# Patient Record
Sex: FEMALE | Race: WHITE | URBAN - METROPOLITAN AREA
[De-identification: names, ages, dates, MRNs, and addresses within clinical notes are randomized per-mention and may not be internally consistent; named-entity substitution may affect disease eponyms.]

---

## 2017-01-18 ENCOUNTER — PRENATAL OFFICE VISIT - HEALTHEAST (OUTPATIENT)
Dept: MIDWIFE SERVICES | Facility: CLINIC | Age: 32
End: 2017-01-18

## 2017-01-18 DIAGNOSIS — Z23 NEED FOR INFLUENZA VACCINATION: ICD-10-CM

## 2017-01-18 ASSESSMENT — MIFFLIN-ST. JEOR: SCORE: 1571.67

## 2017-02-14 ENCOUNTER — PRENATAL OFFICE VISIT - HEALTHEAST (OUTPATIENT)
Dept: MIDWIFE SERVICES | Facility: CLINIC | Age: 32
End: 2017-02-14

## 2017-02-14 DIAGNOSIS — Z34.81 ENCOUNTER FOR SUPERVISION OF OTHER NORMAL PREGNANCY IN FIRST TRIMESTER: ICD-10-CM

## 2017-02-14 ASSESSMENT — MIFFLIN-ST. JEOR: SCORE: 1594.35

## 2017-02-16 ENCOUNTER — COMMUNICATION - HEALTHEAST (OUTPATIENT)
Dept: OBGYN | Facility: CLINIC | Age: 32
End: 2017-02-16

## 2017-03-03 ENCOUNTER — HOSPITAL ENCOUNTER (OUTPATIENT)
Dept: ULTRASOUND IMAGING | Facility: HOSPITAL | Age: 32
Discharge: HOME | End: 2017-03-03
Attending: ADVANCED PRACTICE MIDWIFE

## 2017-03-03 DIAGNOSIS — Z34.81 ENCOUNTER FOR SUPERVISION OF OTHER NORMAL PREGNANCY IN FIRST TRIMESTER: ICD-10-CM

## 2017-03-06 ENCOUNTER — AMBULATORY - HEALTHEAST (OUTPATIENT)
Dept: MIDWIFE SERVICES | Facility: CLINIC | Age: 32
End: 2017-03-06

## 2017-03-15 ENCOUNTER — PRENATAL OFFICE VISIT - HEALTHEAST (OUTPATIENT)
Dept: MIDWIFE SERVICES | Facility: CLINIC | Age: 32
End: 2017-03-15

## 2017-03-15 DIAGNOSIS — Z34.81 ENCOUNTER FOR SUPERVISION OF OTHER NORMAL PREGNANCY IN FIRST TRIMESTER: ICD-10-CM

## 2017-03-15 DIAGNOSIS — O99.212 OBESITY IN PREGNANCY, ANTEPARTUM, SECOND TRIMESTER: ICD-10-CM

## 2017-03-15 ASSESSMENT — MIFFLIN-ST. JEOR: SCORE: 1621.57

## 2017-04-04 ENCOUNTER — COMMUNICATION - HEALTHEAST (OUTPATIENT)
Dept: OBGYN | Facility: CLINIC | Age: 32
End: 2017-04-04

## 2017-04-05 ENCOUNTER — PRENATAL OFFICE VISIT - HEALTHEAST (OUTPATIENT)
Dept: MIDWIFE SERVICES | Facility: CLINIC | Age: 32
End: 2017-04-05

## 2017-04-05 DIAGNOSIS — Z34.82 ENCOUNTER FOR SUPERVISION OF OTHER NORMAL PREGNANCY IN SECOND TRIMESTER: ICD-10-CM

## 2017-04-05 ASSESSMENT — MIFFLIN-ST. JEOR: SCORE: 1617.03

## 2017-04-21 ENCOUNTER — PRENATAL OFFICE VISIT - HEALTHEAST (OUTPATIENT)
Dept: MIDWIFE SERVICES | Facility: CLINIC | Age: 32
End: 2017-04-21

## 2017-04-21 DIAGNOSIS — Z34.82 ENCOUNTER FOR SUPERVISION OF OTHER NORMAL PREGNANCY IN SECOND TRIMESTER: ICD-10-CM

## 2017-04-21 ASSESSMENT — MIFFLIN-ST. JEOR: SCORE: 1639.71

## 2017-04-22 LAB — SYPHILIS RPR SCREEN - HISTORICAL: NORMAL

## 2017-05-08 ENCOUNTER — COMMUNICATION - HEALTHEAST (OUTPATIENT)
Dept: OBGYN | Facility: CLINIC | Age: 32
End: 2017-05-08

## 2017-05-08 DIAGNOSIS — M62.830 BACK SPASM: ICD-10-CM

## 2017-05-19 ENCOUNTER — PRENATAL OFFICE VISIT - HEALTHEAST (OUTPATIENT)
Dept: MIDWIFE SERVICES | Facility: CLINIC | Age: 32
End: 2017-05-19

## 2017-05-19 DIAGNOSIS — Z34.82 ENCOUNTER FOR SUPERVISION OF OTHER NORMAL PREGNANCY IN SECOND TRIMESTER: ICD-10-CM

## 2017-05-19 ASSESSMENT — MIFFLIN-ST. JEOR: SCORE: 1643.34

## 2017-05-22 ENCOUNTER — COMMUNICATION - HEALTHEAST (OUTPATIENT)
Dept: OBGYN | Facility: CLINIC | Age: 32
End: 2017-05-22

## 2017-05-22 ENCOUNTER — HOSPITAL ENCOUNTER (OUTPATIENT)
Dept: OBGYN | Facility: HOSPITAL | Age: 32
Discharge: HOME | End: 2017-05-22
Attending: ADVANCED PRACTICE MIDWIFE | Admitting: ADVANCED PRACTICE MIDWIFE

## 2017-05-25 ENCOUNTER — PRENATAL OFFICE VISIT - HEALTHEAST (OUTPATIENT)
Dept: MIDWIFE SERVICES | Facility: CLINIC | Age: 32
End: 2017-05-25

## 2017-05-25 DIAGNOSIS — O26.03 EXCESSIVE WEIGHT GAIN IN PREGNANCY, THIRD TRIMESTER: ICD-10-CM
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
## 2017-05-25 DIAGNOSIS — O99.213 OBESITY IN PREGNANCY, ANTEPARTUM, THIRD TRIMESTER: ICD-10-CM

## 2017-05-25 DIAGNOSIS — M62.830 BACK SPASM: ICD-10-CM

## 2017-05-25 DIAGNOSIS — Z34.93 ENCOUNTER FOR SUPERVISION OF NORMAL PREGNANCY IN THIRD TRIMESTER: ICD-10-CM

## 2017-05-25 ASSESSMENT — MIFFLIN-ST. JEOR: SCORE: 1648

## 2017-06-07 ENCOUNTER — PRENATAL OFFICE VISIT - HEALTHEAST (OUTPATIENT)
Dept: MIDWIFE SERVICES | Facility: CLINIC | Age: 32
End: 2017-06-07

## 2017-06-07 DIAGNOSIS — Z34.80 ENCOUNTER FOR SUPERVISION OF OTHER NORMAL PREGNANCY: ICD-10-CM

## 2017-06-07 ASSESSMENT — MIFFLIN-ST. JEOR: SCORE: 1657.07

## 2017-06-21 ENCOUNTER — PRENATAL OFFICE VISIT - HEALTHEAST (OUTPATIENT)
Dept: MIDWIFE SERVICES | Facility: CLINIC | Age: 32
End: 2017-06-21

## 2017-06-21 ENCOUNTER — HOSPITAL ENCOUNTER (OUTPATIENT)
Dept: MEDSURG UNIT | Facility: CLINIC | Age: 32
Discharge: HOME | End: 2017-06-21
Attending: ADVANCED PRACTICE MIDWIFE | Admitting: ADVANCED PRACTICE MIDWIFE

## 2017-06-21 DIAGNOSIS — Z34.80 ENCOUNTER FOR SUPERVISION OF OTHER NORMAL PREGNANCY: ICD-10-CM

## 2017-06-21 ASSESSMENT — MIFFLIN-ST. JEOR
SCORE: 1670.68
SCORE: 1670.68

## 2017-06-22 ENCOUNTER — AMBULATORY - HEALTHEAST (OUTPATIENT)
Dept: OBGYN | Facility: CLINIC | Age: 32
End: 2017-06-22

## 2017-06-28 ENCOUNTER — PRENATAL OFFICE VISIT - HEALTHEAST (OUTPATIENT)
Dept: MIDWIFE SERVICES | Facility: CLINIC | Age: 32
End: 2017-06-28

## 2017-06-28 DIAGNOSIS — Z34.80 ENCOUNTER FOR SUPERVISION OF OTHER NORMAL PREGNANCY: ICD-10-CM

## 2017-06-28 ASSESSMENT — MIFFLIN-ST. JEOR: SCORE: 1681.11

## 2017-07-03 ENCOUNTER — TELEPHONE (OUTPATIENT)
Dept: FAMILY MEDICINE | Facility: CLINIC | Age: 32
End: 2017-07-03

## 2017-07-03 NOTE — TELEPHONE ENCOUNTER
Pt is past due for f/u pap smear.  Reminder letter was sent 01/23/17.  LMTC and schedule at [facility].  Left this writer's number in case of questions ([phone]).  If no reply and/or appt within 2 weeks (07/17/17) pt will be considered lost to pap tracking f/u.  Yissel Bocanegra,    Pap Tracking

## 2017-07-05 ENCOUNTER — PRENATAL OFFICE VISIT - HEALTHEAST (OUTPATIENT)
Dept: MIDWIFE SERVICES | Facility: CLINIC | Age: 32
End: 2017-07-05

## 2017-07-05 DIAGNOSIS — Z34.80 ENCOUNTER FOR SUPERVISION OF OTHER NORMAL PREGNANCY: ICD-10-CM

## 2017-07-05 ASSESSMENT — MIFFLIN-ST. JEOR: SCORE: 1675.67

## 2017-07-12 ENCOUNTER — PRENATAL OFFICE VISIT - HEALTHEAST (OUTPATIENT)
Dept: MIDWIFE SERVICES | Facility: CLINIC | Age: 32
End: 2017-07-12

## 2017-07-12 DIAGNOSIS — Z34.80 ENCOUNTER FOR SUPERVISION OF OTHER NORMAL PREGNANCY: ICD-10-CM

## 2017-07-12 ASSESSMENT — MIFFLIN-ST. JEOR: SCORE: 1672.04

## 2017-07-15 ENCOUNTER — COMMUNICATION - HEALTHEAST (OUTPATIENT)
Dept: OBGYN | Facility: CLINIC | Age: 32
End: 2017-07-15

## 2017-07-16 ENCOUNTER — ANESTHESIA - HEALTHEAST (OUTPATIENT)
Dept: OBGYN | Facility: HOSPITAL | Age: 32
End: 2017-07-16

## 2017-07-16 ENCOUNTER — SURGERY - HEALTHEAST (OUTPATIENT)
Dept: OBGYN | Facility: HOSPITAL | Age: 32
End: 2017-07-16

## 2017-07-17 ASSESSMENT — MIFFLIN-ST. JEOR: SCORE: 1681.11

## 2017-07-19 ENCOUNTER — HOME CARE/HOSPICE - HEALTHEAST (OUTPATIENT)
Dept: HOME HEALTH SERVICES | Facility: HOME HEALTH | Age: 32
End: 2017-07-19

## 2017-07-20 ENCOUNTER — HOME CARE/HOSPICE - HEALTHEAST (OUTPATIENT)
Dept: HOME HEALTH SERVICES | Facility: HOME HEALTH | Age: 32
End: 2017-07-20

## 2017-07-24 ENCOUNTER — TELEPHONE (OUTPATIENT)
Dept: FAMILY MEDICINE | Facility: CLINIC | Age: 32
End: 2017-07-24

## 2017-07-24 DIAGNOSIS — L23.1 CONTACT DERMATITIS DUE TO ADHESIVE BANDAGE: Primary | ICD-10-CM

## 2017-07-24 RX ORDER — TRIAMCINOLONE ACETONIDE 5 MG/G
CREAM TOPICAL
Qty: 30 G | Refills: 1 | Status: SHIPPED | OUTPATIENT
Start: 2017-07-24

## 2017-07-25 ENCOUNTER — TRANSFERRED RECORDS (OUTPATIENT)
Dept: HEALTH INFORMATION MANAGEMENT | Facility: CLINIC | Age: 32
End: 2017-07-25

## 2017-07-28 ENCOUNTER — TRANSFERRED RECORDS (OUTPATIENT)
Dept: HEALTH INFORMATION MANAGEMENT | Facility: CLINIC | Age: 32
End: 2017-07-28

## 2017-07-28 ENCOUNTER — HOME CARE/HOSPICE - HEALTHEAST (OUTPATIENT)
Dept: HOME HEALTH SERVICES | Facility: HOME HEALTH | Age: 32
End: 2017-07-28

## 2017-07-28 ENCOUNTER — COMMUNICATION - HEALTHEAST (OUTPATIENT)
Dept: SCHEDULING | Facility: CLINIC | Age: 32
End: 2017-07-28

## 2017-08-30 ENCOUNTER — OFFICE VISIT - HEALTHEAST (OUTPATIENT)
Dept: MIDWIFE SERVICES | Facility: CLINIC | Age: 32
End: 2017-08-30

## 2017-08-30 DIAGNOSIS — Z30.011 ORAL CONTRACEPTION INITIATION: ICD-10-CM

## 2017-08-30 ASSESSMENT — MIFFLIN-ST. JEOR: SCORE: 1549.57

## 2017-09-05 LAB
HPV INTERPRETATION - HISTORICAL: NORMAL
HPV INTERPRETER - HISTORICAL: NORMAL

## 2017-09-11 LAB
BKR LAB AP ABNORMAL BLEEDING: NO
BKR LAB AP BIRTH CONTROL/HORMONES: NORMAL
BKR LAB AP CERVICAL APPEARANCE: NORMAL
BKR LAB AP GYN ADEQUACY: NORMAL
BKR LAB AP GYN INTERPRETATION: NORMAL
BKR LAB AP HPV REFLEX: NORMAL
BKR LAB AP LMP: 2016
BKR LAB AP PATIENT STATUS: NORMAL
BKR LAB AP PREVIOUS ABNORMAL: NORMAL
BKR LAB AP PREVIOUS NORMAL: NORMAL
HIGH RISK?: NO
PATH REPORT.COMMENTS IMP SPEC: NORMAL
RESULT FLAG (HE HISTORICAL CONVERSION): NORMAL

## 2017-10-17 ENCOUNTER — COMMUNICATION - HEALTHEAST (OUTPATIENT)
Dept: MIDWIFE SERVICES | Facility: CLINIC | Age: 32
End: 2017-10-17

## 2017-11-02 ENCOUNTER — TELEPHONE (OUTPATIENT)
Dept: OTHER | Facility: CLINIC | Age: 32
End: 2017-11-02

## 2017-11-03 NOTE — TELEPHONE ENCOUNTER
11/2/2017    Call Regarding Onboarding P1 MMB    Attempt 1    Message     Comments: INVALID MEMBER          Outreach   AT

## 2017-12-24 ENCOUNTER — HEALTH MAINTENANCE LETTER (OUTPATIENT)
Age: 32
End: 2017-12-24

## 2019-03-09 ENCOUNTER — HEALTH MAINTENANCE LETTER (OUTPATIENT)
Age: 34
End: 2019-03-09

## 2019-04-29 ENCOUNTER — ANESTHESIA - HEALTHEAST (OUTPATIENT)
Dept: SURGERY | Facility: AMBULATORY SURGERY CENTER | Age: 34
End: 2019-04-29

## 2019-04-29 ASSESSMENT — MIFFLIN-ST. JEOR: SCORE: 1653.9

## 2019-04-30 ENCOUNTER — SURGERY - HEALTHEAST (OUTPATIENT)
Dept: SURGERY | Facility: AMBULATORY SURGERY CENTER | Age: 34
End: 2019-04-30

## 2019-04-30 ASSESSMENT — MIFFLIN-ST. JEOR: SCORE: 1653.9

## 2020-03-10 ENCOUNTER — HEALTH MAINTENANCE LETTER (OUTPATIENT)
Age: 35
End: 2020-03-10

## 2020-12-20 ENCOUNTER — HEALTH MAINTENANCE LETTER (OUTPATIENT)
Age: 35
End: 2020-12-20

## 2021-01-14 ENCOUNTER — RECORDS - HEALTHEAST (OUTPATIENT)
Dept: ADMINISTRATIVE | Facility: OTHER | Age: 36
End: 2021-01-14

## 2021-04-24 ENCOUNTER — HEALTH MAINTENANCE LETTER (OUTPATIENT)
Age: 36
End: 2021-04-24

## 2021-05-28 NOTE — ANESTHESIA POSTPROCEDURE EVALUATION
Patient: Jolanta Corey  SUCTION DILATION AND CURRETTAGE  Anesthesia type: MAC    Patient location: Phase II Recovery  Last vitals:   Vitals Value Taken Time   /80 4/30/2019  1:46 PM   Temp 36.1  C (96.9  F) 4/30/2019  1:27 PM   Pulse 72 4/30/2019  1:48 PM   Resp 16 4/30/2019  1:27 PM   SpO2 98 % 4/30/2019  1:48 PM   Vitals shown include unvalidated device data.  Post vital signs: stable  Level of consciousness: awake and responds to simple questions  Post-anesthesia pain: pain controlled  Post-anesthesia nausea and vomiting: no  Pulmonary: unassisted, return to baseline  Cardiovascular: stable and blood pressure at baseline  Hydration: adequate  Anesthetic events: no    QCDR Measures:  ASA# 11 - Lissa-op Cardiac Arrest: ASA11B - Patient did NOT experience unanticipated cardiac arrest  ASA# 12 - Lissa-op Mortality Rate: ASA12B - Patient did NOT die  ASA# 13 - PACU Re-Intubation Rate: NA - No ETT / LMA used for case  ASA# 10 - Composite Anes Safety: ASA10A - No serious adverse event    Additional Notes:

## 2021-05-28 NOTE — ANESTHESIA CARE TRANSFER NOTE
Last vitals:   Vitals:    04/30/19 1327   BP: (P) 98/63   Pulse: (P) 69   Resp: (P) 16   Temp: (P) 36.1  C (96.9  F)   SpO2: (P) 95%     Pt brought to phase 2 on room air. Monitors applied. VSS.    Patient's level of consciousness is drowsy  Spontaneous respirations: yes  Maintains airway independently: yes  Dentition unchanged: yes  Oropharynx: oropharynx clear of all foreign objects    QCDR Measures:  ASA# 20 - Surgical Safety Checklist: WHO surgical safety checklist completed prior to induction    PQRS# 430 - Adult PONV Prevention: 4558F - Pt received => 2 anti-emetic agents (different classes) preop & intraop  ASA# 8 - Peds PONV Prevention: NA - Not pediatric patient, not GA or 2 or more risk factors NOT present  PQRS# 424 - Lissa-op Temp Management: NA - MAC anesthesia or case < 60 minutes  PQRS# 426 - PACU Transfer Protocol: - Transfer of care checklist used  ASA# 14 - Acute Post-op Pain: ASA14B - Patient did NOT experience pain >= 7 out of 10

## 2021-05-28 NOTE — ANESTHESIA PREPROCEDURE EVALUATION
Anesthesia Evaluation      Patient summary reviewed   No history of anesthetic complications     Airway   Mallampati: I   Pulmonary - negative ROS and normal exam    breath sounds clear to auscultation                         Cardiovascular - negative ROS and normal exam  Rhythm: regular  Rate: normal,         Neuro/Psych - negative ROS     Endo/Other    (+) obesity,      GI/Hepatic/Renal - negative ROS           Dental                         Anesthesia Plan  Planned anesthetic: MAC    ASA 2     Anesthetic plan and risks discussed with: patient and spouse    Post-op plan: routine recovery

## 2021-05-30 VITALS — HEIGHT: 65 IN | WEIGHT: 200 LBS | BODY MASS INDEX: 33.32 KG/M2

## 2021-05-30 VITALS — HEIGHT: 65 IN | WEIGHT: 190 LBS | BODY MASS INDEX: 31.65 KG/M2

## 2021-05-30 VITALS — HEIGHT: 65 IN | WEIGHT: 205 LBS | BODY MASS INDEX: 34.16 KG/M2

## 2021-05-30 VITALS — HEIGHT: 65 IN | BODY MASS INDEX: 32.49 KG/M2 | WEIGHT: 195 LBS

## 2021-05-30 VITALS — HEIGHT: 65 IN | BODY MASS INDEX: 33.49 KG/M2 | WEIGHT: 201 LBS

## 2021-05-31 VITALS — BODY MASS INDEX: 34.29 KG/M2 | WEIGHT: 205.8 LBS | HEIGHT: 65 IN

## 2021-05-31 VITALS — HEIGHT: 65 IN | BODY MASS INDEX: 35.62 KG/M2 | WEIGHT: 213.8 LBS

## 2021-05-31 VITALS — WEIGHT: 207 LBS | HEIGHT: 65 IN | BODY MASS INDEX: 34.49 KG/M2

## 2021-05-31 VITALS — BODY MASS INDEX: 35.32 KG/M2 | HEIGHT: 65 IN | WEIGHT: 212 LBS

## 2021-05-31 VITALS — BODY MASS INDEX: 35.82 KG/M2 | HEIGHT: 65 IN | WEIGHT: 215 LBS

## 2021-05-31 VITALS — WEIGHT: 212 LBS | BODY MASS INDEX: 35.32 KG/M2 | HEIGHT: 65 IN

## 2021-05-31 VITALS — HEIGHT: 65 IN | WEIGHT: 215 LBS | BODY MASS INDEX: 35.82 KG/M2

## 2021-05-31 VITALS — WEIGHT: 209 LBS | BODY MASS INDEX: 34.82 KG/M2 | HEIGHT: 65 IN

## 2021-05-31 VITALS — BODY MASS INDEX: 35.49 KG/M2 | HEIGHT: 65 IN | WEIGHT: 213 LBS

## 2021-05-31 VITALS — WEIGHT: 186 LBS | BODY MASS INDEX: 30.99 KG/M2 | HEIGHT: 65 IN

## 2021-06-03 VITALS — WEIGHT: 209 LBS | BODY MASS INDEX: 34.82 KG/M2 | HEIGHT: 65 IN

## 2021-06-08 NOTE — PROGRESS NOTES
Jolanta Corey is here by herself for a routine prenatal visit at 14w0d.  She has no concerns and is feeling well. Denies vaginal bleeding.  Asked numerous normal pregnancy-related questions.  Reviewed safe exercise in pregnancy.   Also requests info on where to travel re: [place].  Advised patient of [employer] website for current travel recommendations.   Notcing some numbness in her hands when she sleeps--reviewed increased water intake, minimizing sodium intake, and wrist splints.  Second trimester screening discussed.  She desires quad screen, will order at next visit.  Appetite is normal. Reviewed weight gain chart with patient.   Anticipatory guidance, warning signs, when to call and CNM contact information reviewed. RTC in 4 weeks.

## 2021-06-08 NOTE — PROGRESS NOTES
"Jolanta Corey is a  at 17w6d presenting for routine OB visit. Here with  Raheem. Overall feeling well. Wondering about traveling via airplane safety as she does for work once in awhile; discussed ok to travel up to 36 weeks and encouraged getting up to walk once per hour during flights. Desires quad screen. She denies any concerns/pain or bleeding. Fetal movement as \"flutters\" started about a week ago. Discussed fetal survey and self scheduling for 20 weeks. Quad screen and fetal survey ordered. RTC 4 weeks.  "

## 2021-06-09 NOTE — PROGRESS NOTES
"Jolanta is here for a MO visit. She is feeling well. We reviewed her normal FS US and QS. She is going to [place] for vacation on 17. Encouraged to walk hourly while flying. Attended day long course at [employer] and enjoyed it. Considering infant CPR. Encouraged to breast feed and encouraged taking BF class.  She is doing well with smoking cessation - \"it is easier than I thought it would be.\" Both parents  of lung cancer: Mother age 54 and father 65. She has supportive aunts and uncles as well as in-laws. Letter written that she can continue flying until 36 weeks if no complications. 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 Exercise encouraged - walking during work breaks. She was surprised with weight gain which didn't match her home scale (5# more). She will RTC in 3 weeks (will be on vacation in 4 weeks) then RTC 3 weeks for 1 hr GCT  "

## 2021-06-09 NOTE — PROGRESS NOTES
Jolanta Corey is here by herself for a routine prenatal visit at 25w0d.  She has c/o intermittent right sided discomfort consistent with round ligament pain.  She first noticed discomfort on Monday follow a walk.  States she felt a sharp pain followed by an achiness for 15-30 minutes.  Also noticed the same pain Tuesday am when she rolled over in bed. Denies abdominal cramping/contractions. Discussed comfort measures and the need to move slowly especially with position change.  Recommend trying a maternity belt and chiropractic work.  She is going on a hiking trip for ~ 5 days next week.  Reviewed increased hydration and listening to her body.  Likely to feel more round ligament pain with increased activity.   She is feeling fetal movement regularly. Denies vaginal bleeding/loss of fluid/symptoms of UTI.  Fetal survey ultrasound results reviewed today; normal. Appetite is normal. Interval weight gain-- lost 1 # since last visit..    Discussed 28 week labs/screening for gestational diabetes, anemia, and syphillis are recommended at her next visit.  Handouts reviewed and provided on glucose testing, breastfeeding and TdaP during pregnancy.  Anticipatory guidance, warning signs, when to call and CNM contact information reviewed.  RTC in 2-3 weeks.
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
## 2021-06-10 NOTE — PROGRESS NOTES
Jolanta Corey is here by herself for a routine prenatal visit at 31w5d.  She has no concerns and is feeling well.  Reviewed EDC and basis for decision.  Patient accepting of this information.    She is feeling fetal movement regularly. Fetal maturity and expectations for fetal movement in the third trimester.  Appetite is normal. Interval weight gain is appropriate.  TdaP given at last visit.   Discussed how to pre-register on our website after 30 weeks. Discussed birth control options after delivery, patient undecided.  Discussed choosing a provider for infant care, patient undecided.  Anticipatory guidance, warning signs (with emphasis on  labor signs and symptoms), when to call and CNM contact information reviewed.
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
## 2021-06-10 NOTE — PROGRESS NOTES
"Jolanta Corey is here with her partner today. She is a  at 27.5 weeks doing well. Denies any warning s/sx. GFM. Notes questions about increased vaginal discharge; denies any leaking sensation or feeling like she broke her water, but wanting guidance in differentiating the two; reviewed with Jolanta. Reviewed s/sx of PTL; denies any symptoms today. Reviewed TWG 19 lbs today; patient reports daily walks with dog and feels her diet is \"healthy\". Reviewed weight gain recommendations of 11-20 lbs and encouraged to increase veggies and fruits in diet, lean proteins, watching 'breakfast shakes' that patient describes having 1-2 a day. GCT, hemoglobin and RPR today. Breast pump today. Reviewed benefits of doulas. May have sister present at her birth. Her sister has had 3 babies. Jolanta is hoping to do unmedicated birth and isn't opposed to narcotics but would like to avoid an epidural if possible. Had CBE at [facility] - one day class that she felt was somewhat helpful. Plan is to RTC 4 weeks. TONE Quiroga, ALEJANDRO was present for the above and is in agreement with the A&P.   "

## 2021-06-11 NOTE — ANESTHESIA PROCEDURE NOTES
Spinal Block    Patient location during procedure: OR  Start time: 7/16/2017 10:09 PM  End time: 7/16/2017 10:15 PM  Reason for block: primary anesthetic    Staffing:  Performing  Anesthesiologist: JENNIFER ARAUZ A    Preanesthetic Checklist  Completed: patient identified, risks, benefits, and alternatives discussed, timeout performed, consent obtained, airway assessed, oxygen available, suction available, emergency drugs available and hand hygiene performed  Spinal Block  Patient position: sitting  Prep: ChloraPrep  Patient monitoring: heart rate, blood pressure, continuous pulse ox and cardiac monitor  Approach: midline  Location: L3-4 (same level as epidural was)  Injection technique: single-shot  Needle type: pencil-tip   Needle gauge: 24 G    Assessment  Sensory level: not assessed at this time.

## 2021-06-11 NOTE — PROGRESS NOTES
Jolanta Corey is here by herself for a routine prenatal visit at 36w3d.  She has no concerns and is feeling well.  Occasional colt okeefe contractions. Feels well hydrated-consumed 50 oz of water today.   Long discussion about her birth plans--desires semi-reclined position for birth--wants sister and her  at the head of the bed.  May not want delayed cord clamping.  Shares that she will try breastfeeding--no-one in her family has  so there is a lack of support from her family.  Discussed support from nurses, cnms, lactation consultants, and [employer].  Shared that we will support her no matter what she chooses.  Fetal movement is normal.  Denies vaginal bleeding/loss of fluid.   Interval weight gain is excessive.  Group B strep obtained today.   Patient shares that her  self-diagnosed pinworm for himself--taking OTC medication to treat.  She is without symptoms currently.  Discussed maintaining good hygiene and frequent handwashing.  She is washing her sheet daily.  She is aware that the meds used to treat pinworm is not typically considered safe in pregnancy, and if she were to experience pinworms, that it does not pose a threat to her pregnancy.  She will continue to monitor for symptoms. Anticipatory guidance, signs of symptoms of labor or SROM, third trimester warning signs, when to call and CNM contact information reviewed. RTC in 1 week, and weekly thereafter.    FHTs 178-196 with doppler for ~1 minute.  Recommend evaluation at [facility].  Discussed NST, possible ultrasound, and hydration.  Spoke with MARIO Roberts, charge at [facility].  Request that patient be seen in triage for fetal tachycardia.  She states that the nursery is on divert so they are unable to take the patient.  Clarified that patient is not in labor.  Will hopefully need monitoring and hydration.  But cannot predict if she will need to deliver or not.  Per Alejandra, in the event that she might deliver, they  "cannot accommodate her.    Called and spoke with MARIO Andrade at [facility] and they can evaluate her there.    I called Alejandra back stating that I am concerned that patient will need to drive 20-30 minutes with fetal heart rate in the 190s.  Requesting eval at [facility].  She states that she was told by \"the higher ups\" that the patient cannot be accommodated there since the nursery is on divert.  Patient advised to go straight to [facility] and to drive carefully.        Unable to confirm vertex presentation via VE.  Recommend ultrasound.  Will order if ultrasound not performed/not necessary as part of evaluation of fetal tachycardia at [facility].      "

## 2021-06-11 NOTE — PROGRESS NOTES
Jolanta Corey is here by herself for a routine prenatal visit at 34w3d.  She has no concerns and is feeling well.  Fetal movement is normal.  Denies vaginal bleeding/loss of fluid.    Interval weight gain is approriate; overall excessive. Reviewed weight gain chart with patient. . Discussed how to pre-register on our website after 30 weeks. Has a breast pump.  EPDS = 2 today, no on #10, feels well.  No concerns about PPD; identifies a good support system.  Reviewed Group B strep vaginal & rectal culture and hemoglobin at one of her upcoming visits between 35-37 weeks.Reviewed and provided patient with handouts.   Anticipatory guidance, warning signs (with emphasis on  labor signs and symptoms), when to call and CNM contact information reviewed. RTC in 2 weeks.

## 2021-06-11 NOTE — ANESTHESIA PREPROCEDURE EVALUATION
Anesthesia Evaluation      Patient summary reviewed   No history of anesthetic complications     Airway   Mallampati: II   Pulmonary - negative ROS and normal exam                          Cardiovascular - negative ROS and normal exam  Rhythm: regular  Rate: normal,         Neuro/Psych - negative ROS     Endo/Other - negative ROS   (+) pregnant     GI/Hepatic/Renal - negative ROS      Other findings: The patient requested epidural earlier in the day, but progressed to 10cm before placement, and decided to adhere to her original birth plan. After pushing without success, the idea of epidural was revisited.      Dental - normal exam                        Anesthesia Plan  Planned anesthetic: epidural and spinal  Has epidural in place and is comfortable, but has been able to ambulate, can raise entire leg off bed, and felt fernandez being placed just now.  In view of this I will pull epidural and do a SAB.  Discussed with pt.  ASA 2     Anesthetic plan and risks discussed with: patient and spouse    Post-op plan: routine recovery

## 2021-06-11 NOTE — PROGRESS NOTES
Jolanta Corey is here by herself for a routine prenatal visit at 39w3d.  She has no concerns and is feeling well.  Ready for baby!   Menstrual-type cramps last Monday--she feels them constantly, then they disappear.  Reviewed early signs of labor.    Fetal movement is normal.  Denies vaginal bleeding/loss of fluid   Mike weight gain since last visit. Reviewed weight gain chart with patient.  Group B strep was negative. Reviewed postdates management--IOL/cervical versus expectant management; to be discussed further at next appointment.  Anticipatory guidance, signs of symptoms of labor or SROM, third trimester warning signs, when to call and CNM contact information reviewed.  RTC in 1 week.

## 2021-06-11 NOTE — ANESTHESIA CARE TRANSFER NOTE
Last vitals:   Vitals:    07/16/17 2325   BP: 111/58   Pulse: 90   Resp: 14   Temp: 36.5  C (97.7  F)   SpO2: 97%     Patient's level of consciousness is drowsy  Spontaneous respirations: yes  Maintains airway independently: yes  Dentition unchanged: yes  Oropharynx: oropharynx clear of all foreign objects    QCDR Measures:  ASA# 20 - Surgical Safety Checklist: ASA20A - Safety Checks Done  PQRS# 430 - Adult PONV Prevention: 4558F-8P - Pt did NOT receive => 2 anti-emetic agents  ASA# 8 - Peds PONV Prevention: NA - Not pediatric patient, not GA or 2 or more risk factors NOT present  PQRS# 424 - Lissa-op Temp Management: 4559F - At least one body temp DOCUMENTED => 35.5C or 95.9F within required timeframe  PQRS# 426 - PACU Transfer Protocol: - Transfer of care checklist used  ASA# 14 - Acute Post-op Pain: ASA14B - Patient did NOT experience pain >= 7 out of 10

## 2021-06-11 NOTE — ANESTHESIA POSTPROCEDURE EVALUATION
Patient: Jolanta Corey   SECTION  Anesthesia type: epidural    Patient location: Labor and Delivery  Last vitals:   Vitals:    17 0432   BP: 110/62   Pulse: 79   Resp: 14   Temp: 36.9  C (98.5  F)   SpO2:      Post vital signs: stable  Level of consciousness: awake and responds to simple questions  Post-anesthesia pain: pain controlled  Post-anesthesia nausea and vomiting: no  Pulmonary: unassisted, return to baseline  Cardiovascular: stable and blood pressure at baseline  Hydration: adequate  Anesthetic events: no    QCDR Measures:  ASA# 11 - Lissa-op Cardiac Arrest: ASA11B - Patient did NOT experience unanticipated cardiac arrest  ASA# 12 - Lissa-op Mortality Rate: ASA12B - Patient did NOT die  ASA# 13 - PACU Re-Intubation Rate: NA - No ETT / LMA used for case  ASA# 10 - Composite Anes Safety: ASA10A - No serious adverse event  ASA# 38 - New Corneal Injury: ASA38A - No new exposure keratitis or corneal abrasion in PACU    Additional Notes:

## 2021-06-11 NOTE — PROGRESS NOTES
Jolanta Corey is here by herself for a routine prenatal visit at 38w3d.  She has no concerns and is feeling well.  Wondering when she might go into labor.  Discussed signs of early labor.  Discussed normalcy of going postdates--will discuss management options next week.  Encouraged to bring in birth plan if she desires.  Fetal movement is normal; though movements are less large.  Denies vaginal bleeding/loss of fluid.   Interval weight loss of 2 pounds; overall weight gain is excessive.   Group B strep was negative.  Reviewed and provided patient with handouts.  Anticipatory guidance, signs of symptoms of labor or SROM, third trimester warning signs, when to call and CNM contact information reviewed.  RTC in 1 week.

## 2021-06-11 NOTE — ANESTHESIA PROCEDURE NOTES
Epidural Block    Patient location during procedure: pre-op  Time Called: 7/16/2017 4:11 PM  Reason for Block:labor epidural  Preanesthetic Checklist  Completed: patient identified, risks, benefits, and alternatives discussed, timeout performed, consent obtained, airway assessed, oxygen available, suction available, emergency drugs available and hand hygiene performed  Procedure  Patient position: sitting  Prep: ChloraPrep  Patient monitoring: continuous pulse oximetry, heart rate and blood pressure  Approach: midline  Location: L3-L4  Injection technique: ALYSE saline  Number of Attempts:1  Needle  Needle type: Kourtney   Needle gauge: 18 G     Catheter in Space: 5  Assessment  Sensory level: T8  No complications

## 2021-06-11 NOTE — DISCHARGE SUMMARY
"Outpatient/Triage Note:    Patient Name:  Jolanta Corey  :      1985  MRN:      [id number]    Assessment:   @ 36w3d with fetal tachycardia now resolved.  BPP .  Reactive NST.    Plan:   - Discharge to home undelivered. Reviewed warning signs including decreased fetal movement, leaking of fluid, vaginal bleeding, or signs of labor. Reviewed how to contact on-call CNM. Follow-up in clinic with CNM within 1 week as scheduled or sooner as needed. All questions answered. Agrees with plan.     Subjective  Jolanta Corey is a 31 y.o.  who presents to [facility] for evaluation of fetal tachycardia identified in clinic.  Per clinic CNM approx 1 minute of monitoring via doppler, FHR was noted to be 178-190bpm.   Pt denies leaking of fluid, bleeding, or changes in fetal movement.  States she felt + fetal movement while en route to hospital.  Has been staying well hydrated, drank \"3 large water jugs\" today.  Last ate at 1400.  Pt denies fever/feeling ill.  Feeling occasional contractions \"nothing uncomfortable or regular\"    Objective  Heart Rate:  [80-94] 94  Resp:  [18] 18  BP: (108-125)/(72-76) 125/76    Physical Exam:  General appearance: NAD  Psych: AAO x3  Skin: Pink, warm & dry  Abdomen: soft, non-tender, gravid  Extremeties: 1+ edema bilaterally,  reflexes 2+, no clonus       FHR:Initially upon presenting to hospital FHR noted to be 160bpm with minimal to moderate variability, no accels, no decels.  Gradually decreased to 145bpm with moderate variability.  Large accel to 190bpm from [id number].  Pt reports feeling \"a lot of movement\" during the FHR accel.  Following BPP FHR noted to be 130bpm, moderate variability, + accels, no decels.  Uterine contractions:Occasional, mild  SVE:Deferred    IVFB initiated shortly after pts arrival.    BPP , MICHELE 10.7cm, vertex presentation per US technician    Total time spent with patient:30 minutes, >50% spent on counseling and coordination of " care.    Provider:  TONE Blackman CNM    Date:  6/21/2017  Time:  5:53 PM

## 2021-06-12 NOTE — PROGRESS NOTES
"[email or web address]@Patient ID: Jolanta Corey is a 32 y.o. female.  Assessment:   Normal postpartum exam at ~6 weeks postpartum.   S/P C/S for failure to descend   Fetal Macrosomia  Oral contraceptive initiation  both--breastfeeding and formula at night    Plan:    1. Pap smear obtained at today's visit. Due for annual Gyn exam in 2018   2. Desired contraception: oral progesterone-only contraceptive. Discussed R/b, MOA, administration, warning signs, use of backup at least 48 hours after initiation of POP.  Will call for Nuva Ring Rx with breastfeeding cessation  3. Discussed resumption of exercise and setting a goal to return to pre-pregnancy weight in the next 6-12 months.   4.  Resumption of intercourse reviewed with possible changes in libido and vaginal lubrication while nursing.  5.  Nutrition and supplements reviewed.  Advised continuation of a prenatal or multivitamin, also Vitamin D3 5000 IU geltab daily and an omega 3 fatty acid supplement.  6.  Discussed eligible for prenatal care with CNM for next pregnancy.  Reviewed options of TOLAC vs repeat   7. Adjustment to parenting, self care and open communication with her support system discussed. Warning signs and symptoms related to postpartum mood disorders reviewed.     Total time spent with patient 40 minutes, >50% counseling, education and coordination of care.  TONE Cooley, ALEJANDRO    Subjective:     Jolanta Corey is a 32 y.o. female who presents for postpartum visit. She is 6 weeks postpartum following a Primary LTCS.  I have fully reviewed the prenatal and intrapartum course. The delivery was at 40 weeks gestation Her baby boy is named Satnam and weighed 9 lbs 4 oz at birth.     Jolanta shares that she had an abnormal papsmear in 2016 at [facility], that she was advised to follow up on but didn't, \"because I was pregnant.\"  Unable to find result in Care Everywhere.  Patient opts to have a papsmear today.    Postpartum course has been " "complicated by wound infection and dehisence. Baby's course has been stable. Baby is feeding by both breast and bottle.  Lochia ceased at 3-4 weeks postpartum.  Bowel function is normal. Bladder function is normal. She has not resumed intercourse. Desired contraception: oral progesterone-only contraceptive; would like Nuva Ring with breastfeeding cessation. [place] postpartum depression screening score: 3, no on #10. She has not resumed regular exercise. Patient returns to work in 6 weeks.    Review of Systems  General:  Denies problem  Eyes: Denies problem  Ears/Nose/Throat: Denies problem  Cardiovascular: Denies problem  Respiratory:  Denies problem  Gastrointestinal:  Denies problem   Genitourinary: Denies problem  Musculoskeletal:  Denies problem  Skin: Denies problem  Neurologic: Denies problem  Psychiatric: Denies Problem  Endocrine: Denies problem    Objective:         Physical Exam:  General Appearance: Alert, cooperative, no distress, appears stated age  Skin: Skin color, texture, turgor normal, no rashes or lesions  Throat: Lips, mucosa, and tongue normal; teeth and gums normal  HEENT: grossly normal; otoscopic and opthalmic exam not performed.   Neck: Supple, symmetrical, trachea midline, no adenopathy;  thyroid: not enlarged, symmetric, no tenderness/mass/nodules  Lungs: Clear to auscultation bilaterally, respirations unlabored  Breasts: No breast masses, tenderness, asymmetry, or nipple discharge.  Heart: Regular rate and rhythm, S1 and S2 normal, no murmur, rub, or gallop  Abdomen: Soft, non-tender.  Incision well healed    Pelvic:External genitalia normal without lesions or irritation. Vagina and cervix show no lesions, inflammation, discharge or tenderness.   No cystocele, No rectocele. Uterus fully involuted.  No adnexal mass or tenderness.  Extremities: Extremities normal without varicosities or edema       Last Pap: 2/2016 \"abnormal\". Unable to locate results in Care Everywhere  Immunization " History   Administered Date(s) Administered     Influenza,seasonal quad, PF, 36+MOS 01/18/2017     Tdap 04/21/2017     Immunization status: up to date and documented

## 2021-07-14 PROBLEM — Z34.82 ENCOUNTER FOR SUPERVISION OF OTHER NORMAL PREGNANCY IN SECOND TRIMESTER: Status: RESOLVED | Noted: 2017-03-15 | Resolved: 2017-07-18

## 2021-07-14 PROBLEM — Z34.90 PREGNANT: Status: RESOLVED | Noted: 2017-07-15 | Resolved: 2017-07-18

## 2021-07-14 PROBLEM — Z34.80 ENCOUNTER FOR SUPERVISION OF OTHER NORMAL PREGNANCY: Status: RESOLVED | Noted: 2017-06-07 | Resolved: 2017-07-18

## 2021-07-14 PROBLEM — Z36.89 NST (NON-STRESS TEST) REACTIVE: Status: RESOLVED | Noted: 2017-06-21 | Resolved: 2017-07-15

## 2021-07-14 PROBLEM — O26.00 EXCESSIVE WEIGHT GAIN IN PREGNANCY: Status: RESOLVED | Noted: 2017-05-25 | Resolved: 2017-07-18

## 2021-07-14 PROBLEM — O42.10 PREMATURE RUPTURE OF MEMBRANES WITH ONSET OF LABOR MORE THAN 24 HOURS FOLLOWING RUPTURE: Status: RESOLVED | Noted: 2017-07-15 | Resolved: 2017-07-18

## 2021-07-14 PROBLEM — Z37.9 NORMAL LABOR: Status: RESOLVED | Noted: 2017-07-16 | Resolved: 2017-07-18

## 2021-10-03 ENCOUNTER — HEALTH MAINTENANCE LETTER (OUTPATIENT)
Age: 36
End: 2021-10-03

## 2022-05-15 ENCOUNTER — HEALTH MAINTENANCE LETTER (OUTPATIENT)
Age: 37
End: 2022-05-15

## 2022-09-11 ENCOUNTER — HEALTH MAINTENANCE LETTER (OUTPATIENT)
Age: 37
End: 2022-09-11

## 2023-06-03 ENCOUNTER — HEALTH MAINTENANCE LETTER (OUTPATIENT)
Age: 38
End: 2023-06-03

## 2025-01-22 NOTE — PROGRESS NOTES
Jolanta Corey is here by herself for a routine prenatal visit at 37w3d.  She has no concerns and is feeling well.  Fetal movement is normal.  Denies vaginal bleeding/loss of fluid. Interval weight gain is excessive as is overall weight gain.  TdaP given previously. Group B strep negative. Hemoglobin discussed and obtained today.  Anticipatory guidance, signs of symptoms of labor or SROM, third trimester warning signs, when to call and CNM contact information reviewed. RTC in 1 week, and weekly thereafter.    
denies